# Patient Record
(demographics unavailable — no encounter records)

---
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
## 2024-11-22 NOTE — PHYSICAL EXAM
[de-identified] : The patient is a well appearing 17 year  old male of their stated age. Neck is supple & nontender to palpation. Negative Spurling's test.   Effected Shoulder: RIGHT  Inspection: Scapula Winging: Negative Deformity: None Erythema: None Ecchymosis: None Abrasions: None Effusion: None   Range of Motion: Active Forward Flexion: 180 degrees Active Abduction: 180 degrees Passive Forward Flexion: 180 degrees Passive Abduction: 180 degrees ER @ 90 degrees: 90 degrees IR @ 90 degrees: 40 degrees ER @ 0 degrees: 50 degrees Motor Exam: Forward Flexion: 5 out of 5 Flexion Plane of Scapula: 5 out of 5 Abduction: 5 out of 5 Internal Rotation: 5 out of 5 External Rotation: 5 out of 5 Distal Motor Strength: 5 out of 5   Stability Testing: Anterior: 1+ Posterior: 1+ Sulcus N: 1+ Sulcus ER: 1+ Provocative Tests: Drop Arm: Negative Impingement: Negative [place]: Negative X-Arm Adduction: Negative Belly Press: Negative Bear Hug: Negative Lift Off: Negative Apprehension: Negative Relocation: Negative Posterior Load & Shift: Negative   Palpation: AC Joint: Nontender Clavicle: Nontender SC Joint: Nontender Bicipital Groove: Nontender Coracoid Process: Nontender Pectoralis Minor Tendon: Nontender Pectoralis Major Tendon: Nontender & palpably intact Latissimus Dorsi: Nontender Proximal Humerus: Nontender Scapula Body: Nontender Medial Scapula Boarder: Nontender Scapula Spine: Nontender   Neurologic Exam: Sensation to Light Touch: Axillary: Grossly intact Ulnar: Grossly intact Radial: Grossly intact Median: Grossly intact Other:  N/A Circulatory/Pulses: Ulnar: 2+ Radial: 2+ Other Pertinent Findings: None   Contralateral Shoulder Range of Motion: Active Forward Flexion: 180 degrees Active Abduction: 180 degrees Passive Forward Flexion: 180 degrees Passive Abduction: 180 degrees ER @ 90 degrees: 90 degrees IR @ 90 degrees: 45 degrees ER @ 0 degrees: 50 degrees Motor Exam: Forward Flexion: 5 out of 5 Flexion Plane of Scapula: 5 out of 5 Abduction: 5 out of 5 Internal Rotation: 5 out of 5 External Rotation: 5 out of 5 Distal Motor Strength: 5 out of 5 Stability Testing: Anterior: 1+ Posterior: 1+ Sulcus N: 1+ Sulcus ER: 1+ Other Pertinent Findings: None   Assessment: The patient is a 17 year old male with right shoulder pain and radiographic and physical exam findings consistent with scar tissue mobilization s/p labral repair. The patients condition is acute Documents/Results Reviewed Today: None  Tests/Studies Independently Interpreted Today: None  Pertinent findings include: 180/180/90/40/50, grossly intact rotator cuff strength,  Confounding medical conditions/concerns: s/p right shoulder EUA anterior Bankart repair, SLAP Repair, remplissage rotator cuff procedure (DOS: 12/27/2023)   Plan: Advised the patient that his recent acute injury and clinical examination is consistent with a breakage of scar tissue. We are cautiously optimistic that this will continue to improve with time. His shoulder is stable upon clinical exam therefore, we are not concerned for any disruption to previous labral repair. He may advance activity as tolerated. Work on endurance. The patient will follow up on a PRN basis unless new symptoms arise.  Tests Ordered: Prescription Medications Ordered: Discussed use of OTC NSAIDs including but not limited to Aleve, Motrin, Tylenol Advil, etc. Braces/DME Ordered: None Activity/Work/Sports Status: As tolerated  Additional Instructions: None Follow-Up: PRN   The patient's current medication management of their orthopedic diagnosis was reviewed today: (1) We discussed a comprehensive treatment plan that included possible pharmaceutical management involving the use of prescription strength medications including but not limited to options such as oral Naprosyn 500mg BID, once daily Meloxicam 15 mg, or 500-650 mg Tylenol versus over the counter oral medications and topical prescription NSAID Pennsaid vs over the counter Voltaren gel.  Based on our extensive discussion, the patient declined prescription medication and will use over the counter Advil, Alleve, Voltaren Gel or Tylenol as directed. (2) There is a moderate risk of morbidity with further treatment, especially from use of prescription strength medications and possible side effects of these medications which include upset stomach with oral medications, skin reactions to topical medications and cardiac/renal issues with long term use. (3) I recommended that the patient follow-up with their medical physician to discuss any significant specific potential issues with long term medication use such as interactions with current medications or with exacerbation of underlying medical comorbidities. (4) The benefits and risks associated with use of injectable, oral or topical, prescription and over the counter anti-inflammatory medications were discussed with the patient. The patient voiced understanding of the risks including but not limited to bleeding, stroke, kidney dysfunction, heart disease, and were referred to the black box warning label for further information.   IKay attest that this documentation has been prepared under the direction and in the presence of Provider Dr. Kings Avalos.   The documentation recorded by the scribe accurately reflects the services IDr. Kings, personally performed and the decisions made by me.
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
## 2024-11-22 NOTE — PHYSICAL EXAM
[de-identified] : The patient is a well appearing 17 year  old male of their stated age. Neck is supple & nontender to palpation. Negative Spurling's test.   Effected Shoulder: RIGHT  Inspection: Scapula Winging: Negative Deformity: None Erythema: None Ecchymosis: None Abrasions: None Effusion: None   Range of Motion: Active Forward Flexion: 180 degrees Active Abduction: 180 degrees Passive Forward Flexion: 180 degrees Passive Abduction: 180 degrees ER @ 90 degrees: 90 degrees IR @ 90 degrees: 40 degrees ER @ 0 degrees: 50 degrees Motor Exam: Forward Flexion: 5 out of 5 Flexion Plane of Scapula: 5 out of 5 Abduction: 5 out of 5 Internal Rotation: 5 out of 5 External Rotation: 5 out of 5 Distal Motor Strength: 5 out of 5   Stability Testing: Anterior: 1+ Posterior: 1+ Sulcus N: 1+ Sulcus ER: 1+ Provocative Tests: Drop Arm: Negative Impingement: Negative [place]: Negative X-Arm Adduction: Negative Belly Press: Negative Bear Hug: Negative Lift Off: Negative Apprehension: Negative Relocation: Negative Posterior Load & Shift: Negative   Palpation: AC Joint: Nontender Clavicle: Nontender SC Joint: Nontender Bicipital Groove: Nontender Coracoid Process: Nontender Pectoralis Minor Tendon: Nontender Pectoralis Major Tendon: Nontender & palpably intact Latissimus Dorsi: Nontender Proximal Humerus: Nontender Scapula Body: Nontender Medial Scapula Boarder: Nontender Scapula Spine: Nontender   Neurologic Exam: Sensation to Light Touch: Axillary: Grossly intact Ulnar: Grossly intact Radial: Grossly intact Median: Grossly intact Other:  N/A Circulatory/Pulses: Ulnar: 2+ Radial: 2+ Other Pertinent Findings: None   Contralateral Shoulder Range of Motion: Active Forward Flexion: 180 degrees Active Abduction: 180 degrees Passive Forward Flexion: 180 degrees Passive Abduction: 180 degrees ER @ 90 degrees: 90 degrees IR @ 90 degrees: 45 degrees ER @ 0 degrees: 50 degrees Motor Exam: Forward Flexion: 5 out of 5 Flexion Plane of Scapula: 5 out of 5 Abduction: 5 out of 5 Internal Rotation: 5 out of 5 External Rotation: 5 out of 5 Distal Motor Strength: 5 out of 5 Stability Testing: Anterior: 1+ Posterior: 1+ Sulcus N: 1+ Sulcus ER: 1+ Other Pertinent Findings: None   Assessment: The patient is a 17 year old male with right shoulder pain and radiographic and physical exam findings consistent with scar tissue mobilization s/p labral repair. The patients condition is acute Documents/Results Reviewed Today: None  Tests/Studies Independently Interpreted Today: None  Pertinent findings include: 180/180/90/40/50, grossly intact rotator cuff strength,  Confounding medical conditions/concerns: s/p right shoulder EUA anterior Bankart repair, SLAP Repair, remplissage rotator cuff procedure (DOS: 12/27/2023)   Plan: Advised the patient that his recent acute injury and clinical examination is consistent with a breakage of scar tissue. We are cautiously optimistic that this will continue to improve with time. His shoulder is stable upon clinical exam therefore, we are not concerned for any disruption to previous labral repair. He may advance activity as tolerated. Work on endurance. The patient will follow up on a PRN basis unless new symptoms arise.  Tests Ordered: Prescription Medications Ordered: Discussed use of OTC NSAIDs including but not limited to Aleve, Motrin, Tylenol Advil, etc. Braces/DME Ordered: None Activity/Work/Sports Status: As tolerated  Additional Instructions: None Follow-Up: PRN   The patient's current medication management of their orthopedic diagnosis was reviewed today: (1) We discussed a comprehensive treatment plan that included possible pharmaceutical management involving the use of prescription strength medications including but not limited to options such as oral Naprosyn 500mg BID, once daily Meloxicam 15 mg, or 500-650 mg Tylenol versus over the counter oral medications and topical prescription NSAID Pennsaid vs over the counter Voltaren gel.  Based on our extensive discussion, the patient declined prescription medication and will use over the counter Advil, Alleve, Voltaren Gel or Tylenol as directed. (2) There is a moderate risk of morbidity with further treatment, especially from use of prescription strength medications and possible side effects of these medications which include upset stomach with oral medications, skin reactions to topical medications and cardiac/renal issues with long term use. (3) I recommended that the patient follow-up with their medical physician to discuss any significant specific potential issues with long term medication use such as interactions with current medications or with exacerbation of underlying medical comorbidities. (4) The benefits and risks associated with use of injectable, oral or topical, prescription and over the counter anti-inflammatory medications were discussed with the patient. The patient voiced understanding of the risks including but not limited to bleeding, stroke, kidney dysfunction, heart disease, and were referred to the black box warning label for further information.   IKay attest that this documentation has been prepared under the direction and in the presence of Provider Dr. Kings Avalos.   The documentation recorded by the scribe accurately reflects the services IDr. Kings, personally performed and the decisions made by me.

## 2025-01-17 NOTE — PHYSICAL EXAM
[Complete] : complete cerumen impaction [Exposed Vessel] : left anterior vessel not exposed [Normal] : normal [Wheezing] : no wheezing [Increased Work of Breathing] : no increased work of breathing with use of accessory muscles and retractions [Normal Gait and Station] : normal gait and station [Normal muscle strength, symmetry and tone of facial, head and neck musculature] : normal muscle strength, symmetry and tone of facial, head and neck musculature [FreeTextEntry8] : BENEDICT IMPACTED CERUMEN REMOVED/ HEARING IMPROVED

## 2025-01-21 NOTE — HISTORY OF PRESENT ILLNESS
[de-identified] : HEARING DOWN FOR THE PAST FEW DAYS MEDICAL HX REVIEWED EPISTAXIS OCCASIONALLY LAST BLEED MILD TODAY

## 2025-01-21 NOTE — PHYSICAL EXAM
[Complete] : complete cerumen impaction [Normal] : normal [Normal Gait and Station] : normal gait and station [Normal muscle strength, symmetry and tone of facial, head and neck musculature] : normal muscle strength, symmetry and tone of facial, head and neck musculature [Exposed Vessel] : left anterior vessel not exposed [Wheezing] : no wheezing [Increased Work of Breathing] : no increased work of breathing with use of accessory muscles and retractions [de-identified] : RETRACTED BENEDICT [de-identified] : NASAL MUCOSAL IRRITATION

## 2025-01-21 NOTE — HISTORY OF PRESENT ILLNESS
[de-identified] : HEARING DOWN FOR THE PAST FEW DAYS MEDICAL HX REVIEWED EPISTAXIS OCCASIONALLY LAST BLEED MILD TODAY

## 2025-05-12 NOTE — HISTORY OF PRESENT ILLNESS
[de-identified] : Reginaldo has chronic acid reflux symptoms with throat burning, sensation of food or liquid regurgitation to the throat, and upper abdominal fullness, discomfort.  Triggers include spicy foods, carbonation, EtOH ingestion.  He has had reflux symptoms for a few months.  He has been taking famotidine 20 mg BID with good effectiveness, most to all symptoms resolved, but if he misses 1-2 doses he has symptoms again.  He tried taking less famotidine without benefit, so increased to the current dose.  No dysphagia with meals.  His father has GERD.

## 2025-05-12 NOTE — ASSESSMENT
[FreeTextEntry1] : Reginaldo is a 17 year old male with chronic GERD symptoms.  We discussed role of endoscopy to evaluate for other causes such as H pylori, EoE, hiatal hernia as well as to assess for any peptic related injury.  He can continue on famotidine until endoscopy.

## 2025-05-12 NOTE — CONSULT LETTER
[Dear  ___] : Dear  [unfilled], [Consult Letter:] : I had the pleasure of evaluating your patient, [unfilled]. [Please see my note below.] : Please see my note below. [Consult Closing:] : Thank you very much for allowing me to participate in the care of this patient.  If you have any questions, please do not hesitate to contact me. [Sincerely,] : Sincerely, [FreeTextEntry3] : Terrence Leal MD MS [facility]

## 2025-06-25 NOTE — CONSULT LETTER
[Dear  ___] : Dear  [unfilled], [Courtesy Letter:] : I had the pleasure of seeing your patient, [unfilled], in my office today. [Please see my note below.] : Please see my note below. [Consult Closing:] : Thank you very much for allowing me to participate in the care of this patient.  If you have any questions, please do not hesitate to contact me. [Sincerely,] : Sincerely, [FreeTextEntry3] : Terrence Leal MD MS [facility]

## 2025-06-25 NOTE — REASON FOR VISIT
[Home] : at home, [unfilled] , at the time of the visit. [Medical Office: ([facility])___] : at the medical office located in  [Telehealth (audio & video)] : This visit was provided via telehealth using real-time 2-way audio visual technology. [Verbal consent obtained from patient] : the patient, [unfilled] [Consultation Follow Up] : a consultation follow up

## 2025-06-25 NOTE — HISTORY OF PRESENT ILLNESS
[de-identified] : Reginaldo has been better since first visit. He had upper endoscopy identifying normal esophagus and stomach.  No peptic inflammation. He has made significant progress with dietary modification. He is on famotidine.  He is still having occasional sensation of fluid in throat, no heart burn or chest pain.  Sensation of post nasal drip.  Just started zyrtec a few days ago.